# Patient Record
Sex: MALE | URBAN - METROPOLITAN AREA
[De-identification: names, ages, dates, MRNs, and addresses within clinical notes are randomized per-mention and may not be internally consistent; named-entity substitution may affect disease eponyms.]

---

## 2021-03-03 ENCOUNTER — TELEPHONE ENCOUNTER (OUTPATIENT)
Dept: URBAN - METROPOLITAN AREA CLINIC 35 | Facility: CLINIC | Age: 56
End: 2021-03-03

## 2021-03-04 ENCOUNTER — OFFICE VISIT (OUTPATIENT)
Dept: URBAN - METROPOLITAN AREA CLINIC 35 | Facility: CLINIC | Age: 56
End: 2021-03-04

## 2021-03-04 VITALS
HEIGHT: 69 IN | DIASTOLIC BLOOD PRESSURE: 70 MMHG | HEART RATE: 55 BPM | WEIGHT: 152 LBS | TEMPERATURE: 97.6 F | SYSTOLIC BLOOD PRESSURE: 118 MMHG | BODY MASS INDEX: 22.51 KG/M2 | OXYGEN SATURATION: 99 %

## 2021-03-04 RX ORDER — SODIUM SULFATE, POTASSIUM SULFATE, MAGNESIUM SULFATE 17.5; 3.13; 1.6 G/ML; G/ML; G/ML
AS DIRECTED SOLUTION, CONCENTRATE ORAL
Qty: 1 | Refills: 0 | OUTPATIENT
Start: 2021-03-04

## 2021-03-04 NOTE — HPI-MIGRATED HPI
;     Positive Occult Blood Test : Patient is a 55 year old  male who presents today for consultation due to positive occult blood test with PCP on (11/25/2020). Admits to having a colonoscopy before in 214 which was normal. He denies a family history of colon or esophageal cancers or polyps. He currently admits 1-2 normal and formed bowel movements per day without strain. Stools are formed. He denies ny mucus, melena or blood in stools. Denies any pruritus ani or rectal pain.  He admits to feeling hemorrhoids intermittently.     ;

## 2021-04-05 ENCOUNTER — OFFICE VISIT (OUTPATIENT)
Dept: URBAN - METROPOLITAN AREA SURGERY CENTER 8 | Facility: SURGERY CENTER | Age: 56
End: 2021-04-05

## 2021-04-05 ENCOUNTER — TELEPHONE ENCOUNTER (OUTPATIENT)
Dept: URBAN - METROPOLITAN AREA CLINIC 35 | Facility: CLINIC | Age: 56
End: 2021-04-05

## 2021-04-16 ENCOUNTER — TELEPHONE ENCOUNTER (OUTPATIENT)
Dept: URBAN - METROPOLITAN AREA CLINIC 35 | Facility: CLINIC | Age: 56
End: 2021-04-16

## 2021-04-19 ENCOUNTER — OFFICE VISIT (OUTPATIENT)
Dept: URBAN - METROPOLITAN AREA CLINIC 35 | Facility: CLINIC | Age: 56
End: 2021-04-19

## 2021-04-26 ENCOUNTER — TELEPHONE ENCOUNTER (OUTPATIENT)
Dept: URBAN - METROPOLITAN AREA CLINIC 35 | Facility: CLINIC | Age: 56
End: 2021-04-26

## 2021-04-26 ENCOUNTER — OFFICE VISIT (OUTPATIENT)
Dept: URBAN - METROPOLITAN AREA SURGERY CENTER 8 | Facility: SURGERY CENTER | Age: 56
End: 2021-04-26

## 2021-04-29 ENCOUNTER — OFFICE VISIT (OUTPATIENT)
Dept: URBAN - METROPOLITAN AREA SURGERY CENTER 8 | Facility: SURGERY CENTER | Age: 56
End: 2021-04-29

## 2021-05-10 ENCOUNTER — OFFICE VISIT (OUTPATIENT)
Dept: URBAN - METROPOLITAN AREA CLINIC 35 | Facility: CLINIC | Age: 56
End: 2021-05-10

## 2021-05-10 ENCOUNTER — LAB OUTSIDE AN ENCOUNTER (OUTPATIENT)
Dept: URBAN - METROPOLITAN AREA CLINIC 31 | Facility: CLINIC | Age: 56
End: 2021-05-10

## 2021-05-10 VITALS
HEART RATE: 54 BPM | OXYGEN SATURATION: 99 % | HEIGHT: 69 IN | WEIGHT: 152 LBS | TEMPERATURE: 98.4 F | SYSTOLIC BLOOD PRESSURE: 114 MMHG | DIASTOLIC BLOOD PRESSURE: 74 MMHG | BODY MASS INDEX: 22.51 KG/M2

## 2021-05-10 RX ORDER — ALUMINUM ZIRCONIUM TRICHLOROHYDREX GLY 0.19 G/G
1 TABLET 30 MINUTES BEFORE MORNING MEAL STICK TOPICAL ONCE A DAY
Qty: 30 | Status: ACTIVE | COMMUNITY

## 2021-05-10 RX ORDER — SODIUM SULFATE, POTASSIUM SULFATE, MAGNESIUM SULFATE 17.5; 3.13; 1.6 G/ML; G/ML; G/ML
AS DIRECTED SOLUTION, CONCENTRATE ORAL
Qty: 1 | Refills: 0 | COMMUNITY
Start: 2021-03-04

## 2021-05-10 NOTE — HPI-MIGRATED HPI
;     Positive Occult Blood Test :  56 year old  male presents today for a follow-up to his Colonoscopy/EGD. Patient denies any complications after his procedure. His procedures were completed on 04/05/2021 and 04/29/2021 with Dr Aranda results noted below. Currently has 1 bowel movement per day.  Stools are firm and formed without melena, blood or mucus. Since the procedure patient denies dysphagia, a globus sensation, any changes in his appetite or bowel habits.      Last visit (03/04/2021) Patient is a 55 year old  male who presents today for consultation due to positive occult blood test with PCP on (11/25/2020). Admits to having a colonoscopy before in 214 which was normal. He denies a family history of colon or esophageal cancers or polyps. He currently admits 1-2 normal and formed bowel movements per day without strain. Stools are formed. He denies mucus, melena or blood in stools. Denies any pruritus ani or rectal pain.  He admits to feeling hemorrhoids intermittently.;

## 2021-05-24 ENCOUNTER — OFFICE VISIT (OUTPATIENT)
Dept: URBAN - METROPOLITAN AREA SURGERY CENTER 8 | Facility: SURGERY CENTER | Age: 56
End: 2021-05-24

## 2021-06-07 ENCOUNTER — OFFICE VISIT (OUTPATIENT)
Dept: URBAN - METROPOLITAN AREA CLINIC 35 | Facility: CLINIC | Age: 56
End: 2021-06-07

## 2021-08-24 ENCOUNTER — TELEPHONE ENCOUNTER (OUTPATIENT)
Dept: URBAN - METROPOLITAN AREA CLINIC 35 | Facility: CLINIC | Age: 56
End: 2021-08-24

## 2021-08-24 RX ORDER — OMEPRAZOLE 40 MG/1
1 CAPSULE CAPSULE, DELAYED RELEASE ORAL
Qty: 30 | Refills: 3 | OUTPATIENT
Start: 2021-08-31

## 2021-08-24 RX ORDER — SUCRALFATE 1 G/10ML
10 ML ON AN EMPTY STOMACH SUSPENSION ORAL TWICE A DAY
Qty: 600 | Refills: 0 | OUTPATIENT
Start: 2021-08-31

## 2021-09-24 ENCOUNTER — OFFICE VISIT (OUTPATIENT)
Dept: URBAN - METROPOLITAN AREA CLINIC 35 | Facility: CLINIC | Age: 56
End: 2021-09-24

## 2021-09-24 VITALS
HEIGHT: 69 IN | DIASTOLIC BLOOD PRESSURE: 72 MMHG | HEART RATE: 53 BPM | BODY MASS INDEX: 22.66 KG/M2 | OXYGEN SATURATION: 98 % | WEIGHT: 153 LBS | SYSTOLIC BLOOD PRESSURE: 116 MMHG

## 2021-09-24 PROBLEM — 196735001 CSG - CHRONIC SUPERFICIAL GASTRITIS: Status: ACTIVE | Noted: 2021-05-10

## 2021-09-24 PROBLEM — 397825006 GASTRIC ULCER: Status: ACTIVE | Noted: 2021-05-10

## 2021-09-24 RX ORDER — OMEPRAZOLE 40 MG/1
1 CAPSULE CAPSULE, DELAYED RELEASE ORAL
Qty: 30 | Refills: 3 | Status: ACTIVE | COMMUNITY
Start: 2021-08-31

## 2021-09-24 RX ORDER — ALUMINUM ZIRCONIUM TRICHLOROHYDREX GLY 0.19 G/G
1 TABLET 30 MINUTES BEFORE MORNING MEAL STICK TOPICAL ONCE A DAY
Qty: 30 | Status: ON HOLD | COMMUNITY

## 2021-09-24 RX ORDER — SUCRALFATE 1 G/10ML
10 ML ON AN EMPTY STOMACH SUSPENSION ORAL TWICE A DAY
Qty: 600 | Refills: 0 | Status: ACTIVE | COMMUNITY
Start: 2021-08-31

## 2021-09-24 RX ORDER — SODIUM SULFATE, POTASSIUM SULFATE, MAGNESIUM SULFATE 17.5; 3.13; 1.6 G/ML; G/ML; G/ML
AS DIRECTED SOLUTION, CONCENTRATE ORAL
Qty: 1 | Refills: 0 | COMMUNITY
Start: 2021-03-04

## 2021-09-24 NOTE — HPI-MIGRATED HPI
;   ;     Gastric Ulcer : 56 year old male is present today for a f/u of gastric ulcer. He had a EGD done on  04/29/2021 which revealed he had a gastric ulcer. He was started on Omeprazole 40 mg daily and Sucralfate suspension, 1 GM TID, and he admits noticeable improvement in symptoms. He currently denies any dysphagia, globus, sour eructations, bloating/gas, indigestion, early satiety, changes in appetite, coughing, or changes in bowel habits.     ;   Abdominal Pain : Patient admits to epigastric pain since late July.  He states the pain has improved since it started, but it's not resolved.  Eating does not affect the pain.  He decribes the pain as a discomfort.  He states the discomfort will last about an hour when it occurs.  He thinks eating will make the discomfort better.  Denies nausea or emesis.  No bloating or gas.;

## 2021-10-29 ENCOUNTER — TELEPHONE ENCOUNTER (OUTPATIENT)
Dept: URBAN - METROPOLITAN AREA CLINIC 35 | Facility: CLINIC | Age: 56
End: 2021-10-29